# Patient Record
Sex: MALE | Race: BLACK OR AFRICAN AMERICAN
[De-identification: names, ages, dates, MRNs, and addresses within clinical notes are randomized per-mention and may not be internally consistent; named-entity substitution may affect disease eponyms.]

---

## 2017-05-13 NOTE — ERA
ER Documentation


Chief Complaint


Date/Time


DATE: 5/13/17 


TIME: 04:36


Chief Complaint


right ear ache x 6 days





HPI


Patient complains of feeling there is a bug in his right ear.  Patient has been 

a contact with bedbugs in the past 2 weeks.  Patient's symptoms started about 4 

days ago.  Patient denies dizziness, headache, fever, ear pain, jaw claudication

, change of vision, tinnitus or difficulty breathing.  Denies recent viral 

illness.  There are no other associated manifestations.  Patient has no other 

complaints at this time.





ROS


All systems reviewed and are negative except as per history of present illness.





Allergies


Allergies:  


Coded Allergies:  


     Penicillins (Verified  Allergy, Unknown, 5/13/17)





PMhx/Soc


Medical and Surgical Hx:  pt denies Medical Hx, pt denies Surgical Hx


History of Surgery:  No


Anesthesia Reaction:  No


Hx Neurological Disorder:  No


Hx Respiratory Disorders:  No


Hx Cardiac Disorders:  Yes (HTN)


Hx Psychiatric Problems:  No


Hx Miscellaneous Medical Probl:  No


Hx Alcohol Use:  Yes


Hx Substance Use:  No


Hx Tobacco Use:  No





Physical Exam


Vitals





Vital Signs








  Date Time  Temp Pulse Resp B/P Pulse Ox O2 Delivery O2 Flow Rate FiO2


 


5/13/17 02:03 98.4 64 20 167/80 98   








Physical Exam


Const: Morbidly obese 62-year-old male


Head:   Atraumatic 


Eyes:    Normal Conjunctiva


ENT:    Normal External Ears, Nose and Mouth.  Mild cerumen.  Left ear is 

unremarkable.  Ophthalmoscope of the right ear reveals moderate erythematous 

tympanic membrane.  Cone light reflex is visible in both ears bilaterally.


Neck:               Full range of motion..~ No meningismus.


Resp:    Clear to auscultation bilaterally


Cardio:    Regular rate and rhythm, no murmurs


Abd:    Soft, non tender, non distended. Normal bowel sounds


Skin:    No petechiae or rashes


Back:    No midline or flank tenderness


Ext:    No cyanosis, or edema


Neur:    Awake and alert


Psych:    Normal Mood and Affect





Procedures/MDM


Workup for evaluation of feeling there is something in patient's right ear.  

Patient's symptoms are 4 days ago.  On initial visualization with 

ophthalmoscope of the right ear no foreign bodies are seen although cerumen was 

hindering complete view.  Ear irrigation and cleaning was ordered.  After 

reevaluation there is no change.  Exam was significant for an erythematous 

tympanic membrane there is neither retracting or bulging.  Most likely 

diagnosis is acute otitis media the right ear.





Departure


Diagnosis:  


 Primary Impression:  


 Ear problem


 Qualified Code:  H93.91 - Ear problem, right


Condition:  Stable





Additional Instructions:  


Follow up with your PCP within the next 1-3 days for a more thorough evaluation 

and a possible referral to a specialist. Return the the emergency department 

immediately if symptoms worsen or change. If you have any questions regarding 

medications, ask your pharmacist or us before you leave. If any adverse 

reactions occur while taking your medications, discontinue the treatment and 

return to the emergency department immediately.  Take your medications as 

directed, and complete the entire course of treatment.











DARRIAN VERDE PA-C May 13, 2017 04:40

## 2017-05-23 NOTE — ERD
ER Documentation


Chief Complaint


Date/Time


DATE: 5/23/17 


TIME: 02:42


Chief Complaint


RIGHT EAR itching.





HPI


62-year-old male presents to emergency department for complaints of right ear 

itching for 1 week now, feels like a bug is inside his ear. Patient denies any 

pain. Patient denies any ear discharge. Patient did not take any medications to 

help with symptoms. Patient was recently diagnosed with an ear infection, the 

pain has resolved, finish course of antibiotics. Patient denies any problems 

with hearing.





ROS


All systems reviewed and are negative except as per history of present illness.





Medications


Home Meds


Active Scripts


Cetirizine Hcl* (Zyrtec*) 10 Mg Capsule, 10 MG PO DAILY, #30 TAB.CHEW


   Prov:ANNA HAGEN NP         5/23/17


Azithromycin* (Zithromax*) 250 Mg Tablet, 250 MG PO DAILY for 4 Days, TAB


   Prov:DARRIAN VERDE PA-C         5/13/17


Azithromycin* (Zithromax*) 500 Mg Tablet, 500 MG PO DAILY for 1 Day, TAB


   Prov:DARRIAN VERDE PA-C         5/13/17





Allergies


Allergies:  


Coded Allergies:  


     Penicillins (Verified  Allergy, Unknown, 5/22/17)





PMhx/Soc


History of Surgery:  No


Anesthesia Reaction:  No


Hx Neurological Disorder:  No


Hx Respiratory Disorders:  No


Hx Cardiac Disorders:  Yes (HTN)


Hx Psychiatric Problems:  No


Hx Miscellaneous Medical Probl:  No


Hx Alcohol Use:  Yes


Hx Substance Use:  No


Hx Tobacco Use:  No





FmHx


Family History:  No coronary disease, No diabetes, No other





Physical Exam


Vitals





Vital Signs








  Date Time  Temp Pulse Resp B/P Pulse Ox O2 Delivery O2 Flow Rate FiO2


 


5/22/17 22:09 98.1 69 20 132/85 98   








Physical Exam


GENERAL:  The patient is well developed and appropriate for usual state of 

health, in no apparent distress.


HEENT: Atraumatic. Ears: Normal tympanic membrane, no erythema or bulging. No 

ear canal swelling. No ear discharge. No foreign body noted in the ear. Noted 

right ear canal to be erythematous. Nose: normal nasal turbinates, no erythema 

or swelling. Normal nasal discharge. Throat: oropharynx clear. No tonsillar 

swelling or tonsillar exudates. No lymphadenopathy.


CHEST:  Clear to auscultation bilaterally. There are no rales, wheezes or 

rhonchi. 


HEART:  Regular rate and rhythm. No murmurs, clicks, rubs or gallops. No S3 or 

S4.


ABDOMEN:  Soft, nontender and nondistended. Good bowel sounds. No rebound or 

guarding. No gross peritonitis. No gross organomegaly or masses. No Garsia sign 

or McBurney point tenderness.


BACK:  No midline or flank tenderness.


EXTREMITIES:  Equal pulses bilaterally. There is no peripheral clubbing, 

cyanosis or edema. No focal swelling or erythema. Full range of motion. Grossly 

neurovascularly intact.


NEURO:  Alert and oriented. Cranial nerves 2-12 intact. Motor strength in all 4 

extremities with 5/5 strength.  Sensation grossly intact. Normal speech and 

gait. 


SKIN:  There is no apparent rash or petechia. The skin is warm and dry.


HEMATOLOGIC AND LYMPHATIC:  There is no evidence of excessive bruising or 

lymphedema. No gross cervical, axillary, or inguinal lymphadenopathy.





Procedures/MDM


Medical decision making: Patient's itching in the right ear, most likely can be 

allergy related. No symptoms of ear infection, no symptoms of mastoiditis or 

otitis media, otitis externa. No foreign body in the ear. Patient is a 

prescription for Zyrtec, patient is advised to return to emergency department 

for worsening symptoms.





Departure


Diagnosis:  


 Primary Impression:  


 Itching of ear


Condition:  Stable


Patient Instructions:  Seasonal Allergy











ANNA HAGEN NP May 23, 2017 02:45

## 2017-07-22 NOTE — RADRPT
PROCEDURE:   XR right elbow. 

 

CLINICAL INDICATION:   Right elbow pain and swelling. 

 

TECHNIQUE:   Three views.  Frontal, lateral, and oblique. 

 

COMPARISON:   No prior study is available for comparison.   

 

FINDINGS:

There is no fracture or dislocation.

There is diffuse soft tissue swelling.  There is no joint effusion.

Articular surfaces are intact.

There is no lytic or blastic lesion.

There is no radiopaque foreign body. 

 

IMPRESSION:

1.  Diffuse soft tissue swelling.

2.  No joint effusion.

3.  Otherwise unremarkable images of the right elbow.  

 

RPTAT: QQ

_____________________________________________ 

.Rodrigo Pena MD, MD           Date    Time 

Electronically viewed and signed by .Rodrigo Pena MD, MD on 07/22/2017 19:52 

 

D:  07/22/2017 19:52  T:  07/22/2017 19:52

.R/

## 2017-07-22 NOTE — ERD
ER Documentation


Chief Complaint


Date/Time


DATE: 7/22/17 


TIME: 20:28


Chief Complaint


POSSIBLE BITE ON RT ELBOW /SWELLING





HPI


62-year-old male patient with no significant past medical history presents to 

the ED complaining of swelling noted on the top portion of patient's right 

elbow that started yesterday.  Denies any pain or itchiness.  Denies any 

injuries or trauma.  Denies any fever, chills, loss of sensation, loss of range 

of motion, nausea, vomiting.  Denies any redness.





ROS


All systems reviewed and are negative except as per history of present illness.





Medications


Home Meds


Active Scripts


Ibuprofen* (Motrin*) 400 Mg Tab, 400 MG PO Q6, #30 TAB


   take with food


   Prov:ELISABETH LEON PA-C         7/22/17


Cetirizine Hcl* (Zyrtec*) 10 Mg Capsule, 10 MG PO DAILY, #30 TAB.CHEW


   Prov:ANNA HAGEN NP         5/23/17


Azithromycin* (Zithromax*) 250 Mg Tablet, 250 MG PO DAILY for 4 Days, TAB


   Prov:DARRIAN VERDE PA-C         5/13/17


Azithromycin* (Zithromax*) 500 Mg Tablet, 500 MG PO DAILY for 1 Day, TAB


   Prov:DARRIAN VERDE PA-C         5/13/17





Allergies


Allergies:  


Coded Allergies:  


     Penicillins (Verified  Allergy, Unknown, 7/22/17)





PMhx/Soc


History of Surgery:  Yes (hand, right knee)


Anesthesia Reaction:  No


Hx Neurological Disorder:  No


Hx Respiratory Disorders:  No


Hx Cardiac Disorders:  Yes (HTN)


Hx Psychiatric Problems:  No


Hx Miscellaneous Medical Probl:  No


Hx Alcohol Use:  Yes


Hx Substance Use:  No


Hx Tobacco Use:  No


Smoking Status:  Never smoker





Physical Exam


Vitals


Vital Signs








  Date Time  Temp Pulse Resp B/P Pulse Ox O2 Delivery O2 Flow Rate FiO2


 


7/22/17 18:15 98.0 90 18 138/81 98   








Physical Exam


Const: Non-ill-appearing, well-nourished. In no acute distress.


Head: Atraumatic, normocephalic 


Eyes: Normal Conjunctiva without injection 


ENT: Normal external ear, nose and mouth. 


Neck: Full range of motion. No meningismus. 


Resp: Clear to auscultation bilaterally. No wheezing, rhonchi, rales, or 

crackles. No accessory muscle use. No retractions.


Cardio: Regular rate and rhythm, no murmurs


Skin: No petechiae or rashes


Back: No midline tenderness. No CVA tenderness.


Ext: No cyanosis, or edema. Cap refill less than 2 seconds. Distal pulses 

intact bilaterally.  Edema noted inferior portion of patient's right elbow with 

no erythema, warmth to touch or fluctuance.  No induration.  Full range of 

motion noted with supination, pronation, flexion, extension of bilateral 

elbows. 


Neur: Awake and alert. Normal gait and coordination. Muscle strength 5/5. 

Sensation intact bilaterally.


Psych: Normal Mood and Affect





Procedures/MDM


62-year-old male patient with no sniffing a past medical history presents to 

the ED complaining of swelling noted on the upper right elbow.  Patient is 

afebrile and nontoxic-appearing.  Patient has normal vital signs.  A right 

elbow x-ray was ordered to further evaluate patient. 





PROCEDURE:   XR right elbow. 


 


CLINICAL INDICATION:   Right elbow pain and swelling. 


 


TECHNIQUE:   Three views.  Frontal, lateral, and oblique. 


 


COMPARISON:   No prior study is available for comparison.   


 


FINDINGS:


There is no fracture or dislocation.


There is diffuse soft tissue swelling.  There is no joint effusion.


Articular surfaces are intact.


There is no lytic or blastic lesion.


There is no radiopaque foreign body. 


 


IMPRESSION:


1.  Diffuse soft tissue swelling.


2.  No joint effusion.


3.  Otherwise unremarkable images of the right elbow.  


 


Patient has full range of motion with flexion, extension, supination and 

pronation.  There is low suspicion for septic arthritis. Low suspicion for 

scabies, SJS/TEN, erythema multiforme, sepsis, cellulitis, necrotizing fascitis

, gangrene, meningococcemia or other emergent conditions.  Patient is 

neurovascularly intact. Patient's extremity symptoms have stabilized while they 

have been evaluated in the department and are appropriate for outpatient follow 

up..  Low suspicion for abscess, fractures, dislocations, compartment syndrome, 

neurologic injury, vascular injury, open joint, open fracture, tendon laceration

, septic arthritis, osteomyelitis, DVT, foreign body, or other emergent 

conditions.





Discharge medications: Ibuprofen


Follow up with primary care physician in 1-2 days for further evaluation and 

treatment. Instructed patient to return to the ED sooner for any worsening 

symptoms. Patient's questions were answered. Patient understood and agreed with 

discharge plan. Patient discharged stable.





Departure


Diagnosis:  


 Primary Impression:  


 Soft tissue swelling


Condition:  Stable


Patient Instructions:  Contusion, Soft Tissue, Bursitis, Elbow (Olecranon)


Referrals:  


Psychiatric hospital


YOU HAVE RECEIVED A MEDICAL SCREENING EXAM AND THE RESULTS INDICATE THAT YOU DO 

NOT HAVE A CONDITION THAT REQUIRES URGENT TREATMENT IN THE EMERGENCY DEPARTMENT.





FURTHER EVALUATION AND TREATMENT OF YOUR CONDITION CAN WAIT UNTIL YOU ARE SEEN 

IN YOUR DOCTORS OFFICE WITHIN THE NEXT 1-2 DAYS. IT IS YOUR RESPONSIBILITY TO 

MAKE AN APPOINTMENT FOR FOLOW-UP CARE.





IF YOU HAVE A PRIMARY DOCTOR


--you should call your primary doctor and schedule an appointment





IF YOU DO NOT HAVE A PRIMARY DOCTOR YOU CAN CALL OUR PHYSICIAN REFERRAL HOTLINE 

AT


 [phone] 





IF YOU CAN NOT AFFORD TO SEE A PHYSICIAN YOU CAN CHOSE FROM THE FOLLOWING 

[facility] [phone] [address] [facility]. [facility] [phone] [address] [facility] [facility] [phone] [address] [facility] [phone] [address] [facility] [phone] [address] [facility]. [facility] [phone] [address] [facility]. [facility]


YOU HAVE RECEIVED A MEDICAL SCREENING EXAM AND THE RESULTS INDICATE THAT YOU DO 

NOT HAVE A CONDITION THAT REQUIRES URGENT TREATMENT IN THE EMERGENCY DEPARTMENT.





FURTHER EVALUATION AND TREATMENT OF YOUR CONDITION CAN WAIT UNTIL YOU ARE SEEN 

IN YOUR DOCTORS OFFICE WITHIN THE NEXT 1-2 DAYS. IT IS YOUR RESPONSIBILITY TO 

MAKE AN APPOINTMENT FOR FOLOW-UP CARE.





IF YOU HAVE A PRIMARY DOCTOR


--you should call your primary doctor and schedule and appointment





IF YOU DO NOT HAVE A PRIMARY DOCTOR YOU CAN CALL OUR PHYSICIAN REFERRAL HOTLINE 

AT [phone].





IF YOU CAN NOT AFFORD TO SEE A PHYSICIAN YOU CAN CHOSE FROM THE FOLLOWING 

[facility] INSTITUTIONS:





[facility]


[address] [place] [address]





[facility]


[address] [place] [address]





[facility]


[address] [place] [address]





[facility] URGENT CARE/SPECIALTIES





Additional Instructions:  


Call your primary care doctor TOMORROW for an appointment during the next 2-3 

days for further evaluation and treatment.See the doctor sooner or return here 

if your condition worsens before your appointment time  - fever, increased 

swelling, redness, decreased range of motion, severe pain, etc.











ELISABETH LEON PA-C Jul 22, 2017 20:35

## 2018-03-27 ENCOUNTER — HOSPITAL ENCOUNTER (EMERGENCY)
Dept: HOSPITAL 91 - E/R | Age: 63
Discharge: HOME | End: 2018-03-27
Payer: COMMERCIAL

## 2018-03-27 ENCOUNTER — HOSPITAL ENCOUNTER (EMERGENCY)
Age: 63
Discharge: HOME | End: 2018-03-27

## 2018-03-27 DIAGNOSIS — Z79.82: ICD-10-CM

## 2018-03-27 DIAGNOSIS — G51.0: Primary | ICD-10-CM

## 2018-03-27 DIAGNOSIS — R40.2252: ICD-10-CM

## 2018-03-27 DIAGNOSIS — R40.2362: ICD-10-CM

## 2018-03-27 DIAGNOSIS — R40.2142: ICD-10-CM

## 2018-03-27 DIAGNOSIS — I10: ICD-10-CM

## 2018-03-27 PROCEDURE — 70450 CT HEAD/BRAIN W/O DYE: CPT

## 2018-03-27 PROCEDURE — 99284 EMERGENCY DEPT VISIT MOD MDM: CPT

## 2019-08-31 ENCOUNTER — HOSPITAL ENCOUNTER (EMERGENCY)
Dept: HOSPITAL 91 - E/R | Age: 64
Discharge: HOME | End: 2019-08-31
Payer: COMMERCIAL

## 2019-08-31 ENCOUNTER — HOSPITAL ENCOUNTER (EMERGENCY)
Dept: HOSPITAL 10 - E/R | Age: 64
Discharge: HOME | End: 2019-08-31
Payer: COMMERCIAL

## 2019-08-31 VITALS
BODY MASS INDEX: 36.83 KG/M2 | WEIGHT: 257.28 LBS | BODY MASS INDEX: 36.83 KG/M2 | HEIGHT: 70 IN | HEIGHT: 70 IN | WEIGHT: 257.28 LBS

## 2019-08-31 VITALS — HEART RATE: 56 BPM | SYSTOLIC BLOOD PRESSURE: 126 MMHG | DIASTOLIC BLOOD PRESSURE: 75 MMHG | RESPIRATION RATE: 14 BRPM

## 2019-08-31 DIAGNOSIS — B02.9: Primary | ICD-10-CM

## 2019-08-31 DIAGNOSIS — Z79.82: ICD-10-CM

## 2019-08-31 DIAGNOSIS — I10: ICD-10-CM

## 2019-08-31 LAB
ADD MAN DIFF?: NO
ANION GAP: 6 (ref 5–13)
BASOPHIL #: 0.1 10^3/UL (ref 0–0.1)
BASOPHILS %: 0.9 % (ref 0–2)
BLOOD UREA NITROGEN: 12 MG/DL (ref 7–20)
CALCIUM: 9.3 MG/DL (ref 8.4–10.2)
CARBON DIOXIDE: 30 MMOL/L (ref 21–31)
CHLORIDE: 103 MMOL/L (ref 97–110)
CREATININE: 1.1 MG/DL (ref 0.61–1.24)
EOSINOPHILS #: 0.3 10^3/UL (ref 0–0.5)
EOSINOPHILS %: 4 % (ref 0–7)
GLUCOSE: 90 MG/DL (ref 70–220)
HEMATOCRIT: 44.1 % (ref 42–52)
HEMOGLOBIN: 13.2 G/DL (ref 14–18)
IMMATURE GRANS #M: 0.02 10^3/UL (ref 0–0.03)
IMMATURE GRANS % (M): 0.3 % (ref 0–0.43)
LYMPHOCYTES #: 1.7 10^3/UL (ref 0.8–2.9)
LYMPHOCYTES %: 24.7 % (ref 15–51)
MEAN CORPUSCULAR HEMOGLOBIN: 21.3 PG (ref 29–33)
MEAN CORPUSCULAR HGB CONC: 29.9 G/DL (ref 32–37)
MEAN CORPUSCULAR VOLUME: 71.2 FL (ref 82–101)
MEAN PLATELET VOLUME: 10.1 FL (ref 7.4–10.4)
MONOCYTE #: 0.5 10^3/UL (ref 0.3–0.9)
MONOCYTES %: 8.1 % (ref 0–11)
NEUTROPHIL #: 4.2 10^3/UL (ref 1.6–7.5)
NEUTROPHILS %: 62 % (ref 39–77)
NUCLEATED RED BLOOD CELLS #: 0 10^3/UL (ref 0–0)
NUCLEATED RED BLOOD CELLS%: 0 /100WBC (ref 0–0)
PLATELET COUNT: 289 10^3/UL (ref 140–415)
POTASSIUM: 3.3 MMOL/L (ref 3.5–5.1)
RED BLOOD COUNT: 6.19 10^6/UL (ref 4.7–6.1)
RED CELL DISTRIBUTION WIDTH: 19.8 % (ref 11.5–14.5)
SODIUM: 139 MMOL/L (ref 135–144)
TROPONIN-I: < 0.012 NG/ML (ref 0–0.12)
WHITE BLOOD COUNT: 6.7 10^3/UL (ref 4.8–10.8)

## 2019-08-31 PROCEDURE — 93005 ELECTROCARDIOGRAM TRACING: CPT

## 2019-08-31 PROCEDURE — 96374 THER/PROPH/DIAG INJ IV PUSH: CPT

## 2019-08-31 PROCEDURE — 96375 TX/PRO/DX INJ NEW DRUG ADDON: CPT

## 2019-08-31 PROCEDURE — 99285 EMERGENCY DEPT VISIT HI MDM: CPT

## 2019-08-31 PROCEDURE — 85025 COMPLETE CBC W/AUTO DIFF WBC: CPT

## 2019-08-31 PROCEDURE — 84484 ASSAY OF TROPONIN QUANT: CPT

## 2019-08-31 PROCEDURE — 80048 BASIC METABOLIC PNL TOTAL CA: CPT

## 2019-08-31 PROCEDURE — 36415 COLL VENOUS BLD VENIPUNCTURE: CPT

## 2019-08-31 PROCEDURE — 71045 X-RAY EXAM CHEST 1 VIEW: CPT

## 2019-08-31 RX ADMIN — ONDANSETRON HYDROCHLORIDE 1 MG: 2 INJECTION, SOLUTION INTRAMUSCULAR; INTRAVENOUS at 18:17

## 2019-08-31 RX ADMIN — HYDROMORPHONE HYDROCHLORIDE 1 MG: 1 INJECTION, SOLUTION INTRAMUSCULAR; INTRAVENOUS; SUBCUTANEOUS at 18:18
